# Patient Record
Sex: MALE | Race: WHITE | URBAN - METROPOLITAN AREA
[De-identification: names, ages, dates, MRNs, and addresses within clinical notes are randomized per-mention and may not be internally consistent; named-entity substitution may affect disease eponyms.]

---

## 2019-06-21 ENCOUNTER — OFFICE VISIT (OUTPATIENT)
Dept: OTOLARYNGOLOGY | Facility: CLINIC | Age: 24
End: 2019-06-21

## 2019-06-21 DIAGNOSIS — H65.02 NON-RECURRENT ACUTE SEROUS OTITIS MEDIA OF LEFT EAR: Primary | ICD-10-CM

## 2019-06-21 RX ORDER — METHYLPREDNISOLONE 4 MG/1
TABLET ORAL
Qty: 1 PACKAGE | Refills: 0 | Status: SHIPPED | OUTPATIENT
Start: 2019-06-21

## 2019-06-21 NOTE — PROGRESS NOTES
Jin Garrett is a 27year old male. No chief complaint on file. HPI:   For the last 2 to 3 weeks he has had a blocked feeling in his left ear. There is been no pain or drainage.   He has had no significant history of ear problems in the past.  He took membrane dull. Right tympanic memories slightly retracted     ASSESSMENT AND PLAN:   1. Non-recurrent acute serous otitis media of left ear  Left-sided serous otitis media. Recommend regular use of nasal steroids and antihistamines.   Medrol Dosepak given